# Patient Record
Sex: FEMALE | Race: WHITE | NOT HISPANIC OR LATINO | URBAN - METROPOLITAN AREA
[De-identification: names, ages, dates, MRNs, and addresses within clinical notes are randomized per-mention and may not be internally consistent; named-entity substitution may affect disease eponyms.]

---

## 2021-07-20 NOTE — PATIENT DISCUSSION
Also, please do not hesitate to call us if you have any concerns not addressed by this information. Please call [phone] and we will do everything we can to help you during this period.

## 2022-09-13 ENCOUNTER — NEW PATIENT (OUTPATIENT)
Dept: URBAN - METROPOLITAN AREA CLINIC 14 | Facility: CLINIC | Age: 72
End: 2022-09-13

## 2022-09-13 DIAGNOSIS — H02.834: ICD-10-CM

## 2022-09-13 DIAGNOSIS — H02.831: ICD-10-CM

## 2022-09-13 PROCEDURE — 92285 EXTERNAL OCULAR PHOTOGRAPHY: CPT

## 2022-09-13 PROCEDURE — 99203 OFFICE O/P NEW LOW 30 MIN: CPT

## 2022-09-13 ASSESSMENT — VISUAL ACUITY
OD_CC: 20/25+1
OS_CC: 20/25+1

## 2024-11-01 NOTE — PATIENT DISCUSSION
"The patient is given the patient education document:  ""Eyelid Disorders""
Arline JOHNSON
COUNSELING:
Comments:
Communication Method: Fax
Complete
[facility] Visual Field 36 point screen: I have reviewed the visual fields both taped and untaped on this patient which demonstrate significant obstruction of the patient's peripheral visual field on both eyes.
DERMATOCHALASIS, OU:  VISUALLY SIGNIFICANT.   SCHEDULE UPPER LID BLEPHAROPLASTY, OU.
Dermatochalasis Counseling:  I have examined the patient and reviewed  the photos and visual fields. The excess upper eyelid tissue folds downward towards or onto the  lid margin causing impairment of the peripheral visual field. The visual field improves by lifting this tissue which is supported by evidence of taped visual fields. I have discussed with the patient the option of blepharoplasty surgery to improve the functional visual field. We have discussed the risks and benefits of the surgery at length as well as the location of the incision and the recovery process. The patient understands this discussion, has had all questions answered and desires to proceed with blepharoplasty surgery as explained.
External photos, Both Eyes (Today)
Eye Tests:
General:
HVF - Suprathreshold, Both Eyes (Today)
Letter:  Follow-up
Notify Provider:
PHOTOGRAPHS: I have reviewed the external ocular photographs of this patient which show the following: dermatochalasis both upper eyelids.
Patient Education:
01-Nov-2024 02:23